# Patient Record
Sex: MALE | Race: ASIAN | NOT HISPANIC OR LATINO | URBAN - METROPOLITAN AREA
[De-identification: names, ages, dates, MRNs, and addresses within clinical notes are randomized per-mention and may not be internally consistent; named-entity substitution may affect disease eponyms.]

---

## 2022-04-15 VITALS
HEIGHT: 68.9 IN | DIASTOLIC BLOOD PRESSURE: 69 MMHG | WEIGHT: 145.06 LBS | SYSTOLIC BLOOD PRESSURE: 127 MMHG | HEART RATE: 79 BPM | OXYGEN SATURATION: 99 % | TEMPERATURE: 97 F | RESPIRATION RATE: 16 BRPM

## 2022-04-15 RX ORDER — CHLORHEXIDINE GLUCONATE 213 G/1000ML
1 SOLUTION TOPICAL ONCE
Refills: 0 | Status: DISCONTINUED | OUTPATIENT
Start: 2022-04-20 | End: 2022-04-21

## 2022-04-15 RX ORDER — SIMVASTATIN 20 MG/1
0 TABLET, FILM COATED ORAL
Qty: 0 | Refills: 0 | DISCHARGE

## 2022-04-15 NOTE — H&P ADULT - ASSESSMENT
ASA Class III    Mallampati Class III    EF 30% by ECHO. Euvolemic NS _____.     Took ASA 81 mg PO this AM (started 3 days ago and completed 3 doses). Will provide additional Ecotrin 81 mg PO x1 & Plavix 600 mg PO x1 pre-cath.     Risks & benefits of procedure and alternative therapy have been explained to the patient including but not limited to: allergic reaction, bleeding with possible need for blood transfusion, infection, renal and vascular compromise, limb damage, arrhythmia, stroke, vessel dissection/perforation, Myocardial infarction, emergent CABG. Informed consent obtained and in chart.       Patient a candidate for sedation: Yes    Sedation Type: Moderate   ASA Class III    Mallampati Class III    EF 30% by ECHO. Euvolemic NS _____.     Took ASA 81 mg PO this AM (started 3 days ago and completed 3 doses). Will provide additional Ecotrin 81 mg PO x1 & Plavix 600 mg PO x1 pre-cath.     Consent obtained with assistance of Language Line Mandarin  Chris #[id number].    Risks & benefits of procedure and alternative therapy have been explained to the patient including but not limited to: allergic reaction, bleeding with possible need for blood transfusion, infection, renal and vascular compromise, limb damage, arrhythmia, stroke, vessel dissection/perforation, Myocardial infarction, emergent CABG. Informed consent obtained and in chart.       Patient a candidate for sedation: Yes    Sedation Type: Moderate   82 year old male, Mandarin speaking, former smoker with pAFib (hx of ablation, Eliquis last dose 04/17/2022) HTN, HLD, IDDM II (HbA1c 8.1 04/2022, on insulin and oral Trijardy, last dose 04/20/2022), PAD, chronic systolic CHF (EF 30% on echo 02/2022) who presented to their outpatient cardiologist for evaluation of intermittent substernal chest pressure, he subsequently underwent NST 3/13/22 revealing medium perfusion abnl of severe intensity in apical region and small perfusion abnl of mild intensity in inferior region that were reversible. EF 52% and ECHO 2/21/22: EF 30%, Hypokinesis of anterior wall and anterolateral wall. Grade II DD, mild AI, mild-mod TR, mild PI. In light of risk factors, CCS angina class II symptoms, abnormal NST and newly reduced EF on ECHO pt recommended for cardiac angiogram with possible intervention if clinically indicated.     - NPO for procedure  - pending head CT s/p mechanical fall with head injury on 04/16/2022, if negative will proceed with [facility] as discussed with Dr. Guerin  - Pt on ASA 81 mg po x3 days, will load patient with 81 mg PO x1 & Plavix 600 mg PO x1 pre-cath.   - EF 30% on echocardiogram, no IVF  - Consent obtained with assistance of Language Line Mandarin  Chris #[id number].    ASA Class III  Mallampati Class III    Risks & benefits of procedure and alternative therapy have been explained to the patient including but not limited to: allergic reaction, bleeding with possible need for blood transfusion, infection, renal and vascular compromise, limb damage, arrhythmia, stroke, vessel dissection/perforation, Myocardial infarction, emergent CABG. Informed consent obtained and in chart.       Patient a candidate for sedation: Yes  Sedation Type: Moderate   82 year old male, Mandarin speaking, former smoker with pAFib (hx of ablation, Eliquis last dose 04/17/2022) HTN, HLD, IDDM II (HbA1c 8.1 04/2022, on insulin and oral Trijardy, last dose 04/20/2022), PAD, chronic systolic CHF (EF 30% on echo 02/2022) who presented to their outpatient cardiologist for evaluation of intermittent substernal chest pressure, he subsequently underwent NST 3/13/22 revealing medium perfusion abnl of severe intensity in apical region and small perfusion abnl of mild intensity in inferior region that were reversible. EF 52% and ECHO 2/21/22: EF 30%, Hypokinesis of anterior wall and anterolateral wall. Grade II DD, mild AI, mild-mod TR, mild PI. In light of risk factors, CCS angina class II symptoms, abnormal NST and newly reduced EF on ECHO pt recommended for cardiac angiogram with possible intervention if clinically indicated.     - NPO for procedure  - pending head CT s/p mechanical fall with head injury on 04/16/2022. CT head NC revealing NO ICH/hydrocephalus/extra-axial fluid collection & NO CT e/o acute transcortical infarction.  - Pt on ASA 81 mg po x3 days, will load patient with 81 mg PO x1 & Plavix 600 mg PO x1 pre-cath.   - EF 30% on echocardiogram, Cr WNL, euvolemic on exam, no pre-cath IV fluids. Plan to hydrate post cath pending LVEDP.  - Consent obtained with assistance of Language Line Mandarin  Chris #[id number].      No acute intracranial hemorrhage, hydrocephalus or extra-axial fluid   collection.  Mild parenchymal volume loss and mild chronic microvascular ischemic   changes.  No CT evidence for acute transcortical infarction. Please note that MR   imaging is a more sensitive imaging modality for detection of acute   infarction and may be obtained as clinically warranted.    ASA Class III  Mallampati Class III    Risks & benefits of procedure and alternative therapy have been explained to the patient including but not limited to: allergic reaction, bleeding with possible need for blood transfusion, infection, renal and vascular compromise, limb damage, arrhythmia, stroke, vessel dissection/perforation, Myocardial infarction, emergent CABG. Informed consent obtained and in chart.       Patient a candidate for sedation: Yes  Sedation Type: Moderate   82 year old male, Mandarin speaking, former smoker with pAFib (hx of ablation, Eliquis last dose 04/17/2022) HTN, HLD, IDDM II (HbA1c 8.1 04/2022, on insulin and oral Trijardy, last dose 04/20/2022), PAD, chronic systolic CHF (EF 30% on echo 02/2022) who presented to their outpatient cardiologist for evaluation of intermittent substernal chest pressure, he subsequently underwent NST 3/13/22 revealing medium perfusion abnl of severe intensity in apical region and small perfusion abnl of mild intensity in inferior region that were reversible. EF 52% and ECHO 2/21/22: EF 30%, Hypokinesis of anterior wall and anterolateral wall. Grade II DD, mild AI, mild-mod TR, mild PI. In light of risk factors, CCS angina class II symptoms, abnormal NST and newly reduced EF on ECHO pt recommended for cardiac angiogram with possible intervention if clinically indicated.     - NPO for procedure  - pending head CT s/p mechanical fall with head injury on 04/16/2022. CT head NC revealing NO ICH/hydrocephalus/extra-axial fluid collection & NO CT e/o acute transcortical infarction.  - Pt on ASA 81 mg po x3 days, will load patient with 81 mg PO x1 & Plavix 600 mg PO x1 pre-cath.   - EF 30% on echocardiogram, Cr WNL, euvolemic on exam, no pre-cath IV fluids. Plan to hydrate post cath pending LVEDP.  - Consent obtained with assistance of Language Line Mandarin  EarlWinFreeCandyyvonne #[id number].    ASA Class III  Mallampati Class III    Risks & benefits of procedure and alternative therapy have been explained to the patient including but not limited to: allergic reaction, bleeding with possible need for blood transfusion, infection, renal and vascular compromise, limb damage, arrhythmia, stroke, vessel dissection/perforation, Myocardial infarction, emergent CABG. Informed consent obtained and in chart.       Patient a candidate for sedation: Yes  Sedation Type: Moderate   82 year old male, Mandarin speaking, former smoker with pAFib (hx of ablation, Eliquis last dose 04/17/2022) HTN, HLD, IDDM II (HbA1c 8.1 04/2022, on insulin and oral Trijardy, last dose 04/20/2022), PAD, chronic systolic CHF (EF 30% on echo 02/2022) who presented to their outpatient cardiologist for evaluation of intermittent substernal chest pressure, he subsequently underwent NST 3/13/22 revealing medium perfusion abnl of severe intensity in apical region and small perfusion abnl of mild intensity in inferior region that were reversible. EF 52% and ECHO 2/21/22: EF 30%, Hypokinesis of anterior wall and anterolateral wall. Grade II DD, mild AI, mild-mod TR, mild PI. In light of risk factors, CCS angina class II symptoms, abnormal NST and newly reduced EF on ECHO pt recommended for cardiac angiogram with possible intervention if clinically indicated.     - NPO for procedure  - pending head CT s/p mechanical fall with head injury on 04/16/2022. CT head NC revealing NO ICH/hydrocephalus/extra-axial fluid collection & NO CT e/o acute transcortical infarction.  - Pt on ASA 81 mg po x3 days  (last day today), will provided additional ASA 81 mg PO x1 to complete 325 mg load & Plavix 600 mg PO x1 pre-cath.   - EF 30% on echocardiogram, Cr WNL, euvolemic on exam, no pre-cath IV fluids. Plan to hydrate post cath pending LVEDP.  - Consent obtained with assistance of Language Line Mandarin  Chris #[id number].    ASA Class III  Mallampati Class III    Risks & benefits of procedure and alternative therapy have been explained to the patient including but not limited to: allergic reaction, bleeding with possible need for blood transfusion, infection, renal and vascular compromise, limb damage, arrhythmia, stroke, vessel dissection/perforation, Myocardial infarction, emergent CABG. Informed consent obtained and in chart.       Patient a candidate for sedation: Yes  Sedation Type: Moderate

## 2022-04-15 NOTE — H&P ADULT - NSHPLABSRESULTS_GEN_ALL_CORE
14.2   6.70  )-----------( 157      ( 20 Apr 2022 12:19 )             43.1       04-20    139  |  104  |  14  ----------------------------<  107<H>  4.1   |  25  |  0.66    Ca    9.2      20 Apr 2022 12:19    TPro  7.0  /  Alb  4.1  /  TBili  0.6  /  DBili  x   /  AST  23  /  ALT  18  /  AlkPhos  50  04-20      PT/INR - ( 20 Apr 2022 12:19 )   PT: 13.2 sec;   INR: 1.11          PTT - ( 20 Apr 2022 12:19 )  PTT:36.0 sec    CARDIAC MARKERS ( 20 Apr 2022 12:19 )  x     / x     / 51 U/L / x     / 1.1 ng/mL            EKG:
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
## 2022-04-15 NOTE — H&P ADULT - ADDITIONAL PE
Head: small abrasion about 1 cm on superior, posterior aspect of scalp. Tenderness to light palpation of scalp.

## 2022-04-15 NOTE — H&P ADULT - NEUROLOGICAL DETAILS
No focal neuro deficits/alert and oriented x 3 No focal neuro deficits/alert and oriented x 3/responds to verbal commands/sensation intact/cranial nerves intact/normal strength

## 2022-04-15 NOTE — H&P ADULT - NSICDXPASTMEDICALHX_GEN_ALL_CORE_FT
PAST MEDICAL HISTORY:  HLD (hyperlipidemia)     HTN (hypertension)     PAD (peripheral artery disease)     Unspecified atrial fibrillation

## 2022-04-20 ENCOUNTER — INPATIENT (INPATIENT)
Facility: HOSPITAL | Age: 82
LOS: 0 days | Discharge: HOME | DRG: 246 | End: 2022-04-21
Attending: INTERNAL MEDICINE | Admitting: INTERNAL MEDICINE
Payer: MEDICARE

## 2022-04-20 LAB
A1C WITH ESTIMATED AVERAGE GLUCOSE RESULT: 8.3 % — HIGH (ref 4–5.6)
ALBUMIN SERPL ELPH-MCNC: 4.1 G/DL — SIGNIFICANT CHANGE UP (ref 3.3–5)
ALP SERPL-CCNC: 50 U/L — SIGNIFICANT CHANGE UP (ref 40–120)
ALT FLD-CCNC: 18 U/L — SIGNIFICANT CHANGE UP (ref 10–45)
ANION GAP SERPL CALC-SCNC: 10 MMOL/L — SIGNIFICANT CHANGE UP (ref 5–17)
APTT BLD: 36 SEC — HIGH (ref 27.5–35.5)
AST SERPL-CCNC: 23 U/L — SIGNIFICANT CHANGE UP (ref 10–40)
BASOPHILS # BLD AUTO: 0.04 K/UL — SIGNIFICANT CHANGE UP (ref 0–0.2)
BASOPHILS NFR BLD AUTO: 0.6 % — SIGNIFICANT CHANGE UP (ref 0–2)
BILIRUB SERPL-MCNC: 0.6 MG/DL — SIGNIFICANT CHANGE UP (ref 0.2–1.2)
BUN SERPL-MCNC: 14 MG/DL — SIGNIFICANT CHANGE UP (ref 7–23)
CALCIUM SERPL-MCNC: 9.2 MG/DL — SIGNIFICANT CHANGE UP (ref 8.4–10.5)
CHLORIDE SERPL-SCNC: 104 MMOL/L — SIGNIFICANT CHANGE UP (ref 96–108)
CHOLEST SERPL-MCNC: 176 MG/DL — SIGNIFICANT CHANGE UP
CK MB CFR SERPL CALC: 1.1 NG/ML — SIGNIFICANT CHANGE UP (ref 0–6.7)
CK SERPL-CCNC: 51 U/L — SIGNIFICANT CHANGE UP (ref 30–200)
CO2 SERPL-SCNC: 25 MMOL/L — SIGNIFICANT CHANGE UP (ref 22–31)
CREAT SERPL-MCNC: 0.66 MG/DL — SIGNIFICANT CHANGE UP (ref 0.5–1.3)
EGFR: 94 ML/MIN/1.73M2 — SIGNIFICANT CHANGE UP
EOSINOPHIL # BLD AUTO: 0.1 K/UL — SIGNIFICANT CHANGE UP (ref 0–0.5)
EOSINOPHIL NFR BLD AUTO: 1.5 % — SIGNIFICANT CHANGE UP (ref 0–6)
ESTIMATED AVERAGE GLUCOSE: 192 MG/DL — HIGH (ref 68–114)
GLUCOSE BLDC GLUCOMTR-MCNC: 120 MG/DL — HIGH (ref 70–99)
GLUCOSE BLDC GLUCOMTR-MCNC: 161 MG/DL — HIGH (ref 70–99)
GLUCOSE BLDC GLUCOMTR-MCNC: 68 MG/DL — LOW (ref 70–99)
GLUCOSE SERPL-MCNC: 107 MG/DL — HIGH (ref 70–99)
HCT VFR BLD CALC: 43.1 % — SIGNIFICANT CHANGE UP (ref 39–50)
HDLC SERPL-MCNC: 59 MG/DL — SIGNIFICANT CHANGE UP
HGB BLD-MCNC: 14.2 G/DL — SIGNIFICANT CHANGE UP (ref 13–17)
IMM GRANULOCYTES NFR BLD AUTO: 0.3 % — SIGNIFICANT CHANGE UP (ref 0–1.5)
INR BLD: 1.11 — SIGNIFICANT CHANGE UP (ref 0.88–1.16)
LIPID PNL WITH DIRECT LDL SERPL: 103 MG/DL — HIGH
LYMPHOCYTES # BLD AUTO: 1.74 K/UL — SIGNIFICANT CHANGE UP (ref 1–3.3)
LYMPHOCYTES # BLD AUTO: 26 % — SIGNIFICANT CHANGE UP (ref 13–44)
MCHC RBC-ENTMCNC: 30.9 PG — SIGNIFICANT CHANGE UP (ref 27–34)
MCHC RBC-ENTMCNC: 32.9 GM/DL — SIGNIFICANT CHANGE UP (ref 32–36)
MCV RBC AUTO: 93.9 FL — SIGNIFICANT CHANGE UP (ref 80–100)
MONOCYTES # BLD AUTO: 0.47 K/UL — SIGNIFICANT CHANGE UP (ref 0–0.9)
MONOCYTES NFR BLD AUTO: 7 % — SIGNIFICANT CHANGE UP (ref 2–14)
NEUTROPHILS # BLD AUTO: 4.33 K/UL — SIGNIFICANT CHANGE UP (ref 1.8–7.4)
NEUTROPHILS NFR BLD AUTO: 64.6 % — SIGNIFICANT CHANGE UP (ref 43–77)
NON HDL CHOLESTEROL: 117 MG/DL — SIGNIFICANT CHANGE UP
NRBC # BLD: 0 /100 WBCS — SIGNIFICANT CHANGE UP (ref 0–0)
PLATELET # BLD AUTO: 157 K/UL — SIGNIFICANT CHANGE UP (ref 150–400)
POTASSIUM SERPL-MCNC: 4.1 MMOL/L — SIGNIFICANT CHANGE UP (ref 3.5–5.3)
POTASSIUM SERPL-SCNC: 4.1 MMOL/L — SIGNIFICANT CHANGE UP (ref 3.5–5.3)
PROT SERPL-MCNC: 7 G/DL — SIGNIFICANT CHANGE UP (ref 6–8.3)
PROTHROM AB SERPL-ACNC: 13.2 SEC — SIGNIFICANT CHANGE UP (ref 10.5–13.4)
RBC # BLD: 4.59 M/UL — SIGNIFICANT CHANGE UP (ref 4.2–5.8)
RBC # FLD: 14.7 % — HIGH (ref 10.3–14.5)
SODIUM SERPL-SCNC: 139 MMOL/L — SIGNIFICANT CHANGE UP (ref 135–145)
TRIGL SERPL-MCNC: 72 MG/DL — SIGNIFICANT CHANGE UP
WBC # BLD: 6.7 K/UL — SIGNIFICANT CHANGE UP (ref 3.8–10.5)
WBC # FLD AUTO: 6.7 K/UL — SIGNIFICANT CHANGE UP (ref 3.8–10.5)

## 2022-04-20 PROCEDURE — 92933 PRQ TRLML C ATHRC ST ANGIOP1: CPT | Mod: LD

## 2022-04-20 PROCEDURE — 92929: CPT | Mod: LD

## 2022-04-20 PROCEDURE — 70450 CT HEAD/BRAIN W/O DYE: CPT | Mod: 26,MB

## 2022-04-20 PROCEDURE — 93458 L HRT ARTERY/VENTRICLE ANGIO: CPT | Mod: 26,59

## 2022-04-20 PROCEDURE — 99152 MOD SED SAME PHYS/QHP 5/>YRS: CPT

## 2022-04-20 PROCEDURE — 92978 ENDOLUMINL IVUS OCT C 1ST: CPT | Mod: 26,LD

## 2022-04-20 RX ORDER — TAMSULOSIN HYDROCHLORIDE 0.4 MG/1
2 CAPSULE ORAL
Qty: 0 | Refills: 0 | DISCHARGE

## 2022-04-20 RX ORDER — SODIUM CHLORIDE 9 MG/ML
1000 INJECTION, SOLUTION INTRAVENOUS
Refills: 0 | Status: DISCONTINUED | OUTPATIENT
Start: 2022-04-20 | End: 2022-04-21

## 2022-04-20 RX ORDER — TAMSULOSIN HYDROCHLORIDE 0.4 MG/1
1 CAPSULE ORAL
Qty: 0 | Refills: 0 | DISCHARGE

## 2022-04-20 RX ORDER — DEXTROSE 50 % IN WATER 50 %
12.5 SYRINGE (ML) INTRAVENOUS ONCE
Refills: 0 | Status: DISCONTINUED | OUTPATIENT
Start: 2022-04-20 | End: 2022-04-21

## 2022-04-20 RX ORDER — DEXTROSE 50 % IN WATER 50 %
25 SYRINGE (ML) INTRAVENOUS ONCE
Refills: 0 | Status: DISCONTINUED | OUTPATIENT
Start: 2022-04-20 | End: 2022-04-21

## 2022-04-20 RX ORDER — SODIUM CHLORIDE 9 MG/ML
1000 INJECTION, SOLUTION INTRAVENOUS
Refills: 0 | Status: DISCONTINUED | OUTPATIENT
Start: 2022-04-20 | End: 2022-04-20

## 2022-04-20 RX ORDER — INSULIN GLARGINE 100 [IU]/ML
0 INJECTION, SOLUTION SUBCUTANEOUS
Qty: 0 | Refills: 0 | DISCHARGE

## 2022-04-20 RX ORDER — DEXTROSE 50 % IN WATER 50 %
25 SYRINGE (ML) INTRAVENOUS ONCE
Refills: 0 | Status: DISCONTINUED | OUTPATIENT
Start: 2022-04-20 | End: 2022-04-20

## 2022-04-20 RX ORDER — LINACLOTIDE 145 UG/1
1 CAPSULE, GELATIN COATED ORAL
Qty: 0 | Refills: 0 | DISCHARGE

## 2022-04-20 RX ORDER — ASPIRIN/CALCIUM CARB/MAGNESIUM 324 MG
81 TABLET ORAL ONCE
Refills: 0 | Status: COMPLETED | OUTPATIENT
Start: 2022-04-20 | End: 2022-04-20

## 2022-04-20 RX ORDER — DILTIAZEM HCL 120 MG
1 CAPSULE, EXT RELEASE 24 HR ORAL
Qty: 0 | Refills: 0 | DISCHARGE

## 2022-04-20 RX ORDER — METOPROLOL TARTRATE 50 MG
1 TABLET ORAL
Qty: 0 | Refills: 0 | DISCHARGE

## 2022-04-20 RX ORDER — DEXTROSE 50 % IN WATER 50 %
12.5 SYRINGE (ML) INTRAVENOUS ONCE
Refills: 0 | Status: DISCONTINUED | OUTPATIENT
Start: 2022-04-20 | End: 2022-04-20

## 2022-04-20 RX ORDER — APIXABAN 2.5 MG/1
5 TABLET, FILM COATED ORAL EVERY 12 HOURS
Refills: 0 | Status: DISCONTINUED | OUTPATIENT
Start: 2022-04-21 | End: 2022-04-21

## 2022-04-20 RX ORDER — DUTASTERIDE 0.5 MG/1
1 CAPSULE, LIQUID FILLED ORAL
Qty: 0 | Refills: 0 | DISCHARGE

## 2022-04-20 RX ORDER — CLOPIDOGREL BISULFATE 75 MG/1
600 TABLET, FILM COATED ORAL ONCE
Refills: 0 | Status: DISCONTINUED | OUTPATIENT
Start: 2022-04-20 | End: 2022-04-20

## 2022-04-20 RX ORDER — ICOSAPENT ETHYL 500 MG/1
2 CAPSULE, LIQUID FILLED ORAL
Qty: 0 | Refills: 0 | DISCHARGE

## 2022-04-20 RX ORDER — ATORVASTATIN CALCIUM 80 MG/1
20 TABLET, FILM COATED ORAL AT BEDTIME
Refills: 0 | Status: DISCONTINUED | OUTPATIENT
Start: 2022-04-20 | End: 2022-04-21

## 2022-04-20 RX ORDER — DILTIAZEM HCL 120 MG
240 CAPSULE, EXT RELEASE 24 HR ORAL DAILY
Refills: 0 | Status: DISCONTINUED | OUTPATIENT
Start: 2022-04-20 | End: 2022-04-21

## 2022-04-20 RX ORDER — SODIUM CHLORIDE 9 MG/ML
500 INJECTION INTRAMUSCULAR; INTRAVENOUS; SUBCUTANEOUS
Refills: 0 | Status: DISCONTINUED | OUTPATIENT
Start: 2022-04-20 | End: 2022-04-21

## 2022-04-20 RX ORDER — INSULIN LISPRO 100/ML
VIAL (ML) SUBCUTANEOUS
Refills: 0 | Status: DISCONTINUED | OUTPATIENT
Start: 2022-04-20 | End: 2022-04-21

## 2022-04-20 RX ORDER — TAMSULOSIN HYDROCHLORIDE 0.4 MG/1
0.4 CAPSULE ORAL AT BEDTIME
Refills: 0 | Status: DISCONTINUED | OUTPATIENT
Start: 2022-04-20 | End: 2022-04-21

## 2022-04-20 RX ORDER — CLOPIDOGREL BISULFATE 75 MG/1
600 TABLET, FILM COATED ORAL ONCE
Refills: 0 | Status: COMPLETED | OUTPATIENT
Start: 2022-04-20 | End: 2022-04-20

## 2022-04-20 RX ORDER — FINASTERIDE 5 MG/1
5 TABLET, FILM COATED ORAL DAILY
Refills: 0 | Status: DISCONTINUED | OUTPATIENT
Start: 2022-04-21 | End: 2022-04-21

## 2022-04-20 RX ORDER — EMPAGLIFLOZIN, LINAGLIPTIN, METFORMIN HYDROCHLORIDE 10; 5; 1000 MG/1; MG/1; MG/1
1 TABLET, EXTENDED RELEASE ORAL
Qty: 0 | Refills: 0 | DISCHARGE

## 2022-04-20 RX ORDER — DEXTROSE 50 % IN WATER 50 %
15 SYRINGE (ML) INTRAVENOUS ONCE
Refills: 0 | Status: DISCONTINUED | OUTPATIENT
Start: 2022-04-20 | End: 2022-04-21

## 2022-04-20 RX ORDER — GLUCAGON INJECTION, SOLUTION 0.5 MG/.1ML
1 INJECTION, SOLUTION SUBCUTANEOUS ONCE
Refills: 0 | Status: DISCONTINUED | OUTPATIENT
Start: 2022-04-20 | End: 2022-04-20

## 2022-04-20 RX ORDER — INSULIN LISPRO 100/ML
VIAL (ML) SUBCUTANEOUS ONCE
Refills: 0 | Status: DISCONTINUED | OUTPATIENT
Start: 2022-04-20 | End: 2022-04-20

## 2022-04-20 RX ORDER — APIXABAN 2.5 MG/1
1 TABLET, FILM COATED ORAL
Qty: 0 | Refills: 0 | DISCHARGE

## 2022-04-20 RX ORDER — GLUCAGON INJECTION, SOLUTION 0.5 MG/.1ML
1 INJECTION, SOLUTION SUBCUTANEOUS ONCE
Refills: 0 | Status: DISCONTINUED | OUTPATIENT
Start: 2022-04-20 | End: 2022-04-21

## 2022-04-20 RX ORDER — DEXTROSE 50 % IN WATER 50 %
15 SYRINGE (ML) INTRAVENOUS ONCE
Refills: 0 | Status: DISCONTINUED | OUTPATIENT
Start: 2022-04-20 | End: 2022-04-20

## 2022-04-20 RX ORDER — CLOPIDOGREL BISULFATE 75 MG/1
75 TABLET, FILM COATED ORAL DAILY
Refills: 0 | Status: DISCONTINUED | OUTPATIENT
Start: 2022-04-21 | End: 2022-04-21

## 2022-04-20 RX ORDER — ALENDRONATE SODIUM 70 MG/1
1 TABLET ORAL
Qty: 0 | Refills: 0 | DISCHARGE

## 2022-04-20 RX ADMIN — SODIUM CHLORIDE 125 MILLILITER(S): 9 INJECTION INTRAMUSCULAR; INTRAVENOUS; SUBCUTANEOUS at 19:00

## 2022-04-20 RX ADMIN — Medication 81 MILLIGRAM(S): at 15:22

## 2022-04-20 RX ADMIN — CLOPIDOGREL BISULFATE 600 MILLIGRAM(S): 75 TABLET, FILM COATED ORAL at 15:22

## 2022-04-20 RX ADMIN — ATORVASTATIN CALCIUM 20 MILLIGRAM(S): 80 TABLET, FILM COATED ORAL at 21:34

## 2022-04-20 RX ADMIN — TAMSULOSIN HYDROCHLORIDE 0.4 MILLIGRAM(S): 0.4 CAPSULE ORAL at 21:33

## 2022-04-20 NOTE — PATIENT PROFILE ADULT - FALL HARM RISK - HARM RISK INTERVENTIONS
Assistance with ambulation/Assistance OOB with selected safe patient handling equipment/Communicate Risk of Fall with Harm to all staff/Discuss with provider need for PT consult/Monitor gait and stability/Provide patient with walking aids - walker, cane, crutches/Reinforce activity limits and safety measures with patient and family/Sit up slowly, dangle for a short time, stand at bedside before walking/Tailored Fall Risk Interventions/Use of alarms - bed, chair and/or voice tab/Visual Cue: Yellow wristband and red socks/Bed in lowest position, wheels locked, appropriate side rails in place/Call bell, personal items and telephone in reach/Instruct patient to call for assistance before getting out of bed or chair/Non-slip footwear when patient is out of bed/[place] to call system/Physically safe environment - no spills, clutter or unnecessary equipment/Purposeful Proactive Rounding/Room/bathroom lighting operational, light cord in reach

## 2022-04-20 NOTE — PATIENT PROFILE ADULT - NSPROIMPLANTSMEDDEV_GEN_A_NUR
I've never seen a sulfa allergy expressed in a pt that was only on hydrochlorothiazide though have Rx it to many with a sulfa allergy.  However, if Renata is concerned (completely understandable) then we should start lisinopril 10 mg daily. Instead of the hydrochlorothiazide.   None

## 2022-04-21 ENCOUNTER — TRANSCRIPTION ENCOUNTER (OUTPATIENT)
Age: 82
End: 2022-04-21

## 2022-04-21 VITALS
OXYGEN SATURATION: 98 % | HEART RATE: 92 BPM | DIASTOLIC BLOOD PRESSURE: 63 MMHG | RESPIRATION RATE: 16 BRPM | SYSTOLIC BLOOD PRESSURE: 111 MMHG

## 2022-04-21 PROBLEM — Z00.00 ENCOUNTER FOR PREVENTIVE HEALTH EXAMINATION: Status: ACTIVE | Noted: 2022-04-21

## 2022-04-21 LAB
ANION GAP SERPL CALC-SCNC: 11 MMOL/L — SIGNIFICANT CHANGE UP (ref 5–17)
BUN SERPL-MCNC: 12 MG/DL — SIGNIFICANT CHANGE UP (ref 7–23)
CALCIUM SERPL-MCNC: 8.6 MG/DL — SIGNIFICANT CHANGE UP (ref 8.4–10.5)
CHLORIDE SERPL-SCNC: 105 MMOL/L — SIGNIFICANT CHANGE UP (ref 96–108)
CO2 SERPL-SCNC: 23 MMOL/L — SIGNIFICANT CHANGE UP (ref 22–31)
CREAT SERPL-MCNC: 0.62 MG/DL — SIGNIFICANT CHANGE UP (ref 0.5–1.3)
EGFR: 96 ML/MIN/1.73M2 — SIGNIFICANT CHANGE UP
GLUCOSE BLDC GLUCOMTR-MCNC: 95 MG/DL — SIGNIFICANT CHANGE UP (ref 70–99)
GLUCOSE SERPL-MCNC: 93 MG/DL — SIGNIFICANT CHANGE UP (ref 70–99)
HCT VFR BLD CALC: 42.3 % — SIGNIFICANT CHANGE UP (ref 39–50)
HGB BLD-MCNC: 13.9 G/DL — SIGNIFICANT CHANGE UP (ref 13–17)
MAGNESIUM SERPL-MCNC: 1.9 MG/DL — SIGNIFICANT CHANGE UP (ref 1.6–2.6)
MCHC RBC-ENTMCNC: 31.1 PG — SIGNIFICANT CHANGE UP (ref 27–34)
MCHC RBC-ENTMCNC: 32.9 GM/DL — SIGNIFICANT CHANGE UP (ref 32–36)
MCV RBC AUTO: 94.6 FL — SIGNIFICANT CHANGE UP (ref 80–100)
NRBC # BLD: 0 /100 WBCS — SIGNIFICANT CHANGE UP (ref 0–0)
PLATELET # BLD AUTO: 149 K/UL — LOW (ref 150–400)
POTASSIUM SERPL-MCNC: 3.8 MMOL/L — SIGNIFICANT CHANGE UP (ref 3.5–5.3)
POTASSIUM SERPL-SCNC: 3.8 MMOL/L — SIGNIFICANT CHANGE UP (ref 3.5–5.3)
RBC # BLD: 4.47 M/UL — SIGNIFICANT CHANGE UP (ref 4.2–5.8)
RBC # FLD: 14.4 % — SIGNIFICANT CHANGE UP (ref 10.3–14.5)
SODIUM SERPL-SCNC: 139 MMOL/L — SIGNIFICANT CHANGE UP (ref 135–145)
WBC # BLD: 4.14 K/UL — SIGNIFICANT CHANGE UP (ref 3.8–10.5)
WBC # FLD AUTO: 4.14 K/UL — SIGNIFICANT CHANGE UP (ref 3.8–10.5)

## 2022-04-21 PROCEDURE — 80061 LIPID PANEL: CPT

## 2022-04-21 PROCEDURE — C1753: CPT

## 2022-04-21 PROCEDURE — C1894: CPT

## 2022-04-21 PROCEDURE — 83735 ASSAY OF MAGNESIUM: CPT

## 2022-04-21 PROCEDURE — C1769: CPT

## 2022-04-21 PROCEDURE — 80048 BASIC METABOLIC PNL TOTAL CA: CPT

## 2022-04-21 PROCEDURE — 82550 ASSAY OF CK (CPK): CPT

## 2022-04-21 PROCEDURE — C1887: CPT

## 2022-04-21 PROCEDURE — 85610 PROTHROMBIN TIME: CPT

## 2022-04-21 PROCEDURE — 85730 THROMBOPLASTIN TIME PARTIAL: CPT

## 2022-04-21 PROCEDURE — 99239 HOSP IP/OBS DSCHRG MGMT >30: CPT

## 2022-04-21 PROCEDURE — 36415 COLL VENOUS BLD VENIPUNCTURE: CPT

## 2022-04-21 PROCEDURE — 93005 ELECTROCARDIOGRAM TRACING: CPT

## 2022-04-21 PROCEDURE — 82962 GLUCOSE BLOOD TEST: CPT

## 2022-04-21 PROCEDURE — 93010 ELECTROCARDIOGRAM REPORT: CPT

## 2022-04-21 PROCEDURE — 80053 COMPREHEN METABOLIC PANEL: CPT

## 2022-04-21 PROCEDURE — 70450 CT HEAD/BRAIN W/O DYE: CPT | Mod: MB

## 2022-04-21 PROCEDURE — C1874: CPT

## 2022-04-21 PROCEDURE — 83036 HEMOGLOBIN GLYCOSYLATED A1C: CPT

## 2022-04-21 PROCEDURE — C1725: CPT

## 2022-04-21 PROCEDURE — 85025 COMPLETE CBC W/AUTO DIFF WBC: CPT

## 2022-04-21 PROCEDURE — C1724: CPT

## 2022-04-21 PROCEDURE — 85027 COMPLETE CBC AUTOMATED: CPT

## 2022-04-21 PROCEDURE — 82553 CREATINE MB FRACTION: CPT

## 2022-04-21 RX ORDER — POTASSIUM CHLORIDE 20 MEQ
20 PACKET (EA) ORAL ONCE
Refills: 0 | Status: COMPLETED | OUTPATIENT
Start: 2022-04-21 | End: 2022-04-21

## 2022-04-21 RX ORDER — MAGNESIUM OXIDE 400 MG ORAL TABLET 241.3 MG
800 TABLET ORAL ONCE
Refills: 0 | Status: COMPLETED | OUTPATIENT
Start: 2022-04-21 | End: 2022-04-21

## 2022-04-21 RX ORDER — CLOPIDOGREL BISULFATE 75 MG/1
1 TABLET, FILM COATED ORAL
Qty: 30 | Refills: 11
Start: 2022-04-21 | End: 2023-04-15

## 2022-04-21 RX ORDER — ATORVASTATIN CALCIUM 80 MG/1
1 TABLET, FILM COATED ORAL
Qty: 0 | Refills: 0 | DISCHARGE

## 2022-04-21 RX ADMIN — Medication 20 MILLIEQUIVALENT(S): at 07:43

## 2022-04-21 RX ADMIN — MAGNESIUM OXIDE 400 MG ORAL TABLET 800 MILLIGRAM(S): 241.3 TABLET ORAL at 07:43

## 2022-04-21 RX ADMIN — Medication 240 MILLIGRAM(S): at 05:20

## 2022-04-21 RX ADMIN — APIXABAN 5 MILLIGRAM(S): 2.5 TABLET, FILM COATED ORAL at 05:20

## 2022-04-21 NOTE — DISCHARGE NOTE PROVIDER - NSDCFUADDAPPT_GEN_ALL_CORE_FT
Please follow up with your cardiologist Dr. Guerin in 1 week. Please call his office and make an appointment to see him.

## 2022-04-21 NOTE — DISCHARGE NOTE PROVIDER - CARE PROVIDER_API CALL
Jesus Guerin (MD)  Cardiovascular Disease; Internal Medicine; Interventional Cardiology  [address] [place] [address], [address]  [place] [address]  Phone: [phone]  Fax: [phone]  Follow Up Time:

## 2022-04-21 NOTE — DISCHARGE NOTE PROVIDER - NSDCCPCAREPLAN_GEN_ALL_CORE_FT
PRINCIPAL DISCHARGE DIAGNOSIS  Diagnosis: CAD (coronary artery disease)  Assessment and Plan of Treatment:   You underwent a cardiac catheterization 4/20/22  and the blockage in your Left anterior descending artery and Diagonal artery (arteries in your heart) was opened with stent placement.NEVER MISS A DOSE OF ASPIRIN OR ELIQUIS.  IF YOU DO, YOU ARE AT RISK OF YOUR STENT CLOSING AND HAVING A HEART ATTACK. DO NOT STOP THESE TWO MEDICATIONS UNLESS INSTRUCTED TO DO SO BY YOUR CARDIOLOGIST  •	Your procedure was done through your  wrist  •	You do not need to keep this area covered and you may shower  •	Please avoid any heavy lifting  (no more than 3 to 5 lbs) or strenuous activity for five days  •	If you develop any swelling, bleeding, hardening of the skin (hematoma formation), acute pain, numbness/tingling  in your arm,  please contact your doctor immediately or call our 24/7 line: [phone]  We have provided you with a prescription for cardiac rehab which is medically supervised exercise program for your heart and has been shown to improve the quantity and quality of life of people with heart disease like yours. You should attend cardiac rehab 3 times per week for 12 weeks. We have provided you with a list of nearby facilities. Please call your insurance carrier to determine which of these facilities are covered under your plan.        SECONDARY DISCHARGE DIAGNOSES  Diagnosis: HTN (hypertension)  Assessment and Plan of Treatment: Hypertension, commonly called high blood pressure, is when the force of blood pumping through your arteries is too strong. Hypertension forces your heart to work harder to pump blood. Your arteries may become narrow or stiff. Having untreated or uncontrolled hypertension for a long period of time can cause heart attack, stroke, kidney disease, and other problems. If started on a medication, take exactly as prescribed by your health care professional. Maintain a healthy lifestyle and follow up with your primary care physician.  - please continue to take home meds as listed.       Diagnosis: HLD (hyperlipidemia)  Assessment and Plan of Treatment: Please continue to take home Lipitor 20 mg daily and vascepa 2 gm two times daily.    Diagnosis: DM (diabetes mellitus)  Assessment and Plan of Treatment: Please continue to take home meds as listed.    Diagnosis: Chronic systolic congestive heart failure  Assessment and Plan of Treatment: -You have a history of weakened heart muscle called congestive heart failure.   -Please make sure you follow up with your cardiologist within one week of discharge.   -Please weigh yourself daily: if you have gained more than 2-3 lbs in one day or 5 lbs in one week contact your doctor immediately as you may be retaining water weight  -In addition, restrict your salt intake to less than 2 grams a day  -If you develop worsening shortening of breath, leg swelling, fatigue, chest pain, difficulty sleeping at night due to shortness of breath, contact your cardiologist immediately.      Diagnosis: Paroxysmal atrial fibrillation  Assessment and Plan of Treatment: People with atrial fibrillation (a type of irregular heartbeat) are at an increased risk of forming a blood clot in the heart, which can travel to the brain, causing a stroke, or to other parts of the body. ELIQUIS lowers your chance of having a stroke by helping to prevent clots from forming. If you stop taking ELIQUIS, you may have increased risk of forming a clot in your blood. Do not stop taking ELIQUIS without talking to your cardiologist. Stopping ELIQUIS increases your risk of having a stroke.

## 2022-04-21 NOTE — DISCHARGE NOTE NURSING/CASE MANAGEMENT/SOCIAL WORK - PATIENT PORTAL LINK FT
You can access the FollowMyHealth Patient Portal offered by [facility] by registering at the following website: [email or web address]. By joining [facility]’s FollowMyHealth portal, you will also be able to view your health information using other applications (apps) compatible with our system.

## 2022-04-21 NOTE — DISCHARGE NOTE PROVIDER - HOSPITAL COURSE
INCOMPLETE !!    81 y/o M,  Mandarin speaking, former smoker with pAFib (hx of ablation, Eliquis last dose 04/17/2022) HTN, HLD, IDDM II (HbA1c 8.1 04/2022, on insulin and oral Trijardy, last dose 04/20/2022), PAD, chronic systolic CHF (EF 30% on echo 02/2022) who presented to their outpatient cardiologist for evaluation of intermittent substernal chest pressure, he subsequently underwent NST 3/13/22 revealing medium perfusion abnl of severe intensity in apical region and small perfusion abnl of mild intensity in inferior region that were reversible. EF 52% and ECHO 2/21/22: EF 30%, Hypokinesis of anterior wall and anterolateral wall. Grade II DD, mild AI, mild-mod TR, mild PI. In light of risk factors, CCS angina class II symptoms, abnormal NST and newly reduced EF on ECHO pt recommended for cardiac angiogram with possible intervention if clinically indicated.     Pt. s/p cardiac catheterization 4/20/22: ELEANOR x1 pLAD 70%, ELEANOR x3 mLAD (2 lesions 99%), ELEANOR x1 oD2 90%, EF 30% per echo, EDP 7, R TR access.    Pt. admitted o/n for monitoring. Pt. seen and examined at bedside today am. Pt. comfortable, denies any CP, SOB, dizziness, palpitations, R radial access site stable, no bleeding and hematoma noted, R radial pulse intact. VSS. Labs stable o/n. home meds reviewed with Dr. Dong  and pt. to be d/c on  DAPT (eliquis/Plavix; no ASA as per Madeline Guerin), Diltiazem 240 mg daily, Lipitor 20 mg daily and Vascepa 2 gm BID.   	  Pt. stable to be d/c as per Dr. Dong  and to f/u with Dr. Guerin  in 1-2 week  Patient has been given appropriate discharge instructions including medication regimen, access site management and follow up. Prescriptions have been e-prescribed to patient's preferred pharmacy               83 y/o M,  Mandarin speaking, former smoker with pAFib (hx of ablation, Eliquis last dose 04/17/2022) HTN, HLD, IDDM II (HbA1c 8.1 04/2022, on insulin and oral Trijardy, last dose 04/20/2022), PAD, chronic systolic CHF (EF 30% on echo 02/2022) who presented to their outpatient cardiologist for evaluation of intermittent substernal chest pressure, he subsequently underwent NST 3/13/22 revealing medium perfusion abnl of severe intensity in apical region and small perfusion abnl of mild intensity in inferior region that were reversible. EF 52% and ECHO 2/21/22: EF 30%, Hypokinesis of anterior wall and anterolateral wall. Grade II DD, mild AI, mild-mod TR, mild PI. In light of risk factors, CCS angina class II symptoms, abnormal NST and newly reduced EF on ECHO pt recommended for cardiac angiogram with possible intervention if clinically indicated.     Pt. s/p cardiac catheterization 4/20/22: ELEANOR x1 pLAD 70%, ELEANOR x3 mLAD (2 lesions 99%), ELEANOR x1 oD2 90%, EF 30% per echo, EDP 7, R TR access.    Pt. admitted o/n for monitoring. Pt. seen and examined at bedside today am.  History obtained with help of Mandarin Language ID # [id number];  4/21 am. Pt. comfortable, denies any CP, SOB, dizziness, palpitations, R radial access site stable, no bleeding and hematoma noted, R radial pulse intact. VSS. Labs stable o/n. home meds reviewed with Dr. Dong  and pt. to be d/c on  DAPT (eliquis/Plavix; no ASA as per Madeline Guerin), Diltiazem 240 mg daily, Lipitor 20 mg daily and Vascepa 2 gm BID.   	  Pt. stable to be d/c as per Dr. Dong  and to f/u with Dr. Guerin  in 1-2 week  Patient has been given appropriate discharge instructions including medication regimen, access site management and follow up. Prescriptions have been e-prescribed to patient's preferred pharmacy    Of note: Pt with hx of sCHF (EF 30 %); as per d/w Fellow Alverto; Dr. Guerin to adjust cardiac meds as o/p; recommended to change CCB to BB, start ACE-I/ARB and increase lipitor dose as o/p.     Cardiac Rehab (STEMI/NSTEMI/ACS/Unstable Angina/CHF/Post PCI):            *Education on benefits of Cardiac Rehab provided to patient: Yes/No         *Referral and Prescription Given for Cardiac Rehab : Yes/No.  If No, Why Not?         *Pt given list of locations & instructed to contact their insurance company to review list of participating providers: YES    Statin Prescribed (STEMI/NSTEMI/UA &/OR PCI this admission):  Yes/No; If No, Why Not? Patient has a statin allergy    DAPT: Prescriptions for Aspirin/Plavix/Brilinta/Effient e-prescribed to patient's pharmacy Yes/No__.

## 2022-04-21 NOTE — DISCHARGE NOTE PROVIDER - NSDCMRMEDTOKEN_GEN_ALL_CORE_FT
atorvastatin 20 mg oral tablet: 1 tab(s) orally once a day  dilTIAZem 240 mg/24 hours oral capsule, extended release: 1 cap(s) orally once a day  dutasteride 0.5 mg oral capsule: 1 cap(s) orally once a day  Eliquis 5 mg oral tablet: 1 tab(s) orally 2 times a day  Fosamax 70 mg oral tablet: 1 tab(s) orally once a week  glipiZIDE 5 mg oral tablet: 1 tab(s) orally once a day  Linzess 145 mcg oral capsule: 1 cap(s) orally once a day  tamsulosin 0.4 mg oral capsule: 2 cap(s) orally once a day  Trijardy XR 10 mg-5 mg-1000 mg oral tablet, extended release: 1 tab(s) orally once a day (in the morning)  Vascepa 1 g oral capsule: 2 cap(s) orally 2 times a day   atorvastatin 20 mg oral tablet: 1 tab(s) orally once a day  cardiac rehab: We have provided you with a prescription for cardiac rehab which is medically supervised exercise program for your heart and has been shown to improve the quantity and quality of life of people with heart disease like yours. You should attend cardiac rehab 3 times per week for 12 weeks. We have provided you with a list of nearby facilities. Please call your insurance carrier to determine which of these facilities are covered under your plan.    cardiologist: Apoorva Gonzalez  dilTIAZem 240 mg/24 hours oral capsule, extended release: 1 cap(s) orally once a day  dutasteride 0.5 mg oral capsule: 1 cap(s) orally once a day  Eliquis 5 mg oral tablet: 1 tab(s) orally 2 times a day  Fosamax 70 mg oral tablet: 1 tab(s) orally once a week  glipiZIDE 5 mg oral tablet: 1 tab(s) orally once a day  Linzess 145 mcg oral capsule: 1 cap(s) orally once a day  tamsulosin 0.4 mg oral capsule: 2 cap(s) orally once a day  Trijardy XR 10 mg-5 mg-1000 mg oral tablet, extended release: 1 tab(s) orally once a day (in the morning)  Vascepa 1 g oral capsule: 2 cap(s) orally 2 times a day

## 2022-04-21 NOTE — DISCHARGE NOTE PROVIDER - NSDCFUADDINST_GEN_ALL_CORE_FT
•	Your procedure was done through your  wrist  •	You do not need to keep this area covered and you may shower  •	Please avoid any heavy lifting  (no more than 3 to 5 lbs) or strenuous activity for five days  •	If you develop any swelling, bleeding, hardening of the skin (hematoma formation), acute pain, numbness/tingling  in your arm, please contact your doctor immediately or call our 24/7 line: [phone]

## 2022-04-27 DIAGNOSIS — R94.39 ABNORMAL RESULT OF OTHER CARDIOVASCULAR FUNCTION STUDY: ICD-10-CM

## 2022-04-27 DIAGNOSIS — Z79.01 LONG TERM (CURRENT) USE OF ANTICOAGULANTS: ICD-10-CM

## 2022-04-27 DIAGNOSIS — I11.0 HYPERTENSIVE HEART DISEASE WITH HEART FAILURE: ICD-10-CM

## 2022-04-27 DIAGNOSIS — I25.119 ATHEROSCLEROTIC HEART DISEASE OF NATIVE CORONARY ARTERY WITH UNSPECIFIED ANGINA PECTORIS: ICD-10-CM

## 2022-04-27 DIAGNOSIS — Z79.4 LONG TERM (CURRENT) USE OF INSULIN: ICD-10-CM

## 2022-04-27 DIAGNOSIS — E11.51 TYPE 2 DIABETES MELLITUS WITH DIABETIC PERIPHERAL ANGIOPATHY WITHOUT GANGRENE: ICD-10-CM
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
## 2022-04-27 DIAGNOSIS — Z87.891 PERSONAL HISTORY OF NICOTINE DEPENDENCE: ICD-10-CM

## 2022-04-27 DIAGNOSIS — I48.0 PAROXYSMAL ATRIAL FIBRILLATION: ICD-10-CM

## 2022-04-27 DIAGNOSIS — I50.22 CHRONIC SYSTOLIC (CONGESTIVE) HEART FAILURE: ICD-10-CM

## 2022-04-27 DIAGNOSIS — S00.01XA ABRASION OF SCALP, INITIAL ENCOUNTER: ICD-10-CM

## 2022-04-27 DIAGNOSIS — Z79.84 LONG TERM (CURRENT) USE OF ORAL HYPOGLYCEMIC DRUGS: ICD-10-CM

## 2022-04-27 DIAGNOSIS — E78.5 HYPERLIPIDEMIA, UNSPECIFIED: ICD-10-CM
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
## 2024-01-23 NOTE — PATIENT PROFILE ADULT - ARRIVAL FROM
[facility] CATH LAB/In house [Time Spent: ___ minutes] : I have spent [unfilled] minutes of time on the encounter.